# Patient Record
Sex: FEMALE | Race: WHITE | URBAN - METROPOLITAN AREA
[De-identification: names, ages, dates, MRNs, and addresses within clinical notes are randomized per-mention and may not be internally consistent; named-entity substitution may affect disease eponyms.]

---

## 2022-09-23 ENCOUNTER — OFFICE VISIT (OUTPATIENT)
Dept: URBAN - METROPOLITAN AREA CLINIC 54 | Facility: CLINIC | Age: 32
End: 2022-09-23
Payer: COMMERCIAL

## 2022-09-23 ENCOUNTER — WEB ENCOUNTER (OUTPATIENT)
Dept: URBAN - METROPOLITAN AREA CLINIC 54 | Facility: CLINIC | Age: 32
End: 2022-09-23

## 2022-09-23 VITALS
SYSTOLIC BLOOD PRESSURE: 126 MMHG | HEIGHT: 67 IN | TEMPERATURE: 97.2 F | WEIGHT: 134 LBS | DIASTOLIC BLOOD PRESSURE: 84 MMHG | HEART RATE: 96 BPM | BODY MASS INDEX: 21.03 KG/M2

## 2022-09-23 DIAGNOSIS — R19.5 CHANGE IN STOOL CALIBER: ICD-10-CM

## 2022-09-23 DIAGNOSIS — K92.1 MELENA: ICD-10-CM

## 2022-09-23 DIAGNOSIS — R17 ELEVATED BILIRUBIN: ICD-10-CM

## 2022-09-23 DIAGNOSIS — R68.81 EARLY SATIETY: ICD-10-CM

## 2022-09-23 DIAGNOSIS — R11.2 NAUSEA AND VOMITING, UNSPECIFIED VOMITING TYPE: ICD-10-CM

## 2022-09-23 DIAGNOSIS — R10.13 EPIGASTRIC PAIN: ICD-10-CM

## 2022-09-23 PROCEDURE — 99244 OFF/OP CNSLTJ NEW/EST MOD 40: CPT

## 2022-09-23 PROCEDURE — 99204 OFFICE O/P NEW MOD 45 MIN: CPT

## 2022-09-23 RX ORDER — OMEPRAZOLE 20 MG/1
1 CAPSULE 30 MINUTES BEFORE MORNING MEAL CAPSULE, DELAYED RELEASE ORAL ONCE A DAY
Qty: 30 | Refills: 1 | OUTPATIENT
Start: 2022-09-23

## 2022-09-23 NOTE — PHYSICAL EXAM GASTROINTESTINAL
Abdomen , soft, mild LUQ tenderness, nondistended , no guarding or rigidity , no masses palpable , normal bowel sounds , Liver and Spleen , no hepatomegaly present , no hepatosplenomegaly , liver nontender , spleen not palpable

## 2022-09-23 NOTE — HPI-TODAY'S VISIT:
Patient was referred by Dr. Juventino Hogan for abd pain. A copy of this document will be sent to the physician. Pt is a healthy 33 yo female who presents for abd pain and n/v. Pt states for the last few months she has felt generalized abd pressure and burning with associated n/v and gagging as well as early satiety. Pt can only tolerate a few bites of food and then becomes full and has discomfort. Vomits up food she ate the night before. Occasionally has mild dysphagia with both foods, liquids, and even saliva, but denies hearburn or reflux. Has noted intermittent black, sticky stool every few weeks. Having some issues with diffucult BMs due to thin stools. Pt states she lost about 15 to 20 lbs a few months ago unintentionally but is slowly gaining it back. Pt does not frequently take NSAIDs and does not drink etoh. No family history of CRC or UGI cancers. Grandmother had H pylori and there was concern about their water source. Pt has never had a cscope or EGD. Labs in May 2022 were normal other than isolated elevated TB at 1.1. Other LFTs were normal, no anemia. Pt had normal CT abd/pelvis WO contrast on 5/11/22.

## 2022-09-24 LAB
A/G RATIO: 2.3
ABSOLUTE BASOPHILS: 31
ABSOLUTE EOSINOPHILS: 22
ABSOLUTE LYMPHOCYTES: 845
ABSOLUTE MONOCYTES: 422
ABSOLUTE NEUTROPHILS: 3080
ALBUMIN: 4.4
ALKALINE PHOSPHATASE: 32
ALT (SGPT): 19
AST (SGOT): 18
BASOPHILS: 0.7
BILIRUBIN, DIRECT: 0.2
BILIRUBIN, INDIRECT: 0.4
BILIRUBIN, TOTAL: 0.6
BILIRUBIN, TOTAL: 0.6
BUN/CREATININE RATIO: (no result)
BUN: 11
CALCIUM: 9.1
CARBON DIOXIDE, TOTAL: 24
CHLORIDE: 106
CREATININE: 0.68
EGFR: 118
EOSINOPHILS: 0.5
GLOBULIN, TOTAL: 1.9
GLUCOSE: 84
HEMATOCRIT: 36.7
HEMOGLOBIN: 11.9
LIPASE: 27
LYMPHOCYTES: 19.2
MCH: 26.8
MCHC: 32.4
MCV: 82.7
MONOCYTES: 9.6
MPV: 11.1
NEUTROPHILS: 70
PLATELET COUNT: 187
POTASSIUM: 4.2
PROTEIN, TOTAL: 6.3
RDW: 12.2
RED BLOOD CELL COUNT: 4.44
SODIUM: 138
WHITE BLOOD CELL COUNT: 4.4

## 2022-09-27 ENCOUNTER — TELEPHONE ENCOUNTER (OUTPATIENT)
Dept: URBAN - METROPOLITAN AREA CLINIC 54 | Facility: CLINIC | Age: 32
End: 2022-09-27

## 2022-09-29 ENCOUNTER — DASHBOARD ENCOUNTERS (OUTPATIENT)
Age: 32
End: 2022-09-29

## 2022-09-29 ENCOUNTER — TELEPHONE ENCOUNTER (OUTPATIENT)
Dept: URBAN - METROPOLITAN AREA CLINIC 54 | Facility: CLINIC | Age: 32
End: 2022-09-29

## 2022-09-30 ENCOUNTER — OFFICE VISIT (OUTPATIENT)
Dept: URBAN - METROPOLITAN AREA SURGERY CENTER 14 | Facility: SURGERY CENTER | Age: 32
End: 2022-09-30

## 2022-10-21 ENCOUNTER — OFFICE VISIT (OUTPATIENT)
Dept: URBAN - METROPOLITAN AREA CLINIC 54 | Facility: CLINIC | Age: 32
End: 2022-10-21

## 2022-10-21 RX ORDER — OMEPRAZOLE 20 MG/1
1 CAPSULE 30 MINUTES BEFORE MORNING MEAL CAPSULE, DELAYED RELEASE ORAL ONCE A DAY
Qty: 30 | Refills: 1 | COMMUNITY
Start: 2022-09-23